# Patient Record
(demographics unavailable — no encounter records)

---

## 2024-11-04 NOTE — CONSULT LETTER
[Dear  ___] : Dear  [unfilled], [( Thank you for referring [unfilled] for consultation for _____ )] : Thank you for referring [unfilled] for consultation for [unfilled] [FreeTextEntry2] : Marylou Reagn MD Family Medicine [address], [address] [place] [address] Phone: [phone] Fax: [phone]

## 2024-11-04 NOTE — DISCUSSION/SUMMARY
[Generalized] : generalized  [Idiopathic] : idiopathic  [Generalized or Multifocal] : generalized or multifocal [Risks Associated with Driving/[place] Law] : As per my usual protocol, the patient was advised in regards to risks and driving privileges associated with the [place] Guidelines.  [Safety Recommendations] : The patient was advised in regards to the risk of seizures and general seizure safety recommendations including not to be bathing alone, climbing to high places and operating heavy machinery. [Compliance with Medications] : The importance of compliance with medications was reinforced. [Medication Side Effects] : High frequency and serious potential medication adverse effects were reviewed with the patient, including but not exclusive to psychiatric effects.  Information sheets on medication side effects were made available to the patient in our clinic.  The patient or advocate agrees to notify us for any concerns. [Sleep Hygiene/Sleep Disruption Risks] : Sleep hygiene and the risks of sleep disruption were discussed. [Risk of Death] : Risk of death associated with seizures / SUDEP was discussed. [FreeTextEntry1] : Based on discussion of pt's prior episodes of recurrent LOC (which could represent CHENCHO seizures vs syncope), as well as pt's most recent episode clearly consistent with GTC on 10/9/24, this is concerning for possibility of multiple unprovoked seizures (underlying epilepsy).   Differential dx / alternative explanation may be h/o recurrent syncope with superimposed first time GTC with buproprion use?  Concern for chronic anxiety.  Sleep issues.  Low libido, wt issues as well in past.  Her PCP/Gyn are currently managing her Bupropion (given that it lowers seizure threshold, we have advised consideration of alternative psychiatric medications, psychotherapy and psychiatry involvement), ie SSRI/SNRI.  Stress management.    Driving restricted.  not driving, but quite difficult for her at this time.  Lacosamide 100mg BID Cont for now.  Consider f/u EEG, med re-eval longterm.  Avoid caffiene, ETOH use.  Lifestyle modifications discussed.  Safety and compliance discussed.  Several questions answered. x45min RTC 5-6mo

## 2024-11-04 NOTE — PHYSICAL EXAM
[FreeTextEntry1] : General Constitutional: alert and in no acute distress.  Psychiatric: affect normal, insight and judgment intact. Neurologic:  Orientation: oriented to person, place, and time  Attention: normal concentrating ability and attention was not decreased.  Language: no difficulty naming common objects, repeating a phrase, writing a sentence; fluency, comprehension, and reading intact.  Fund of knowledge: displays adequate knowledge of personal past history.  Cranial Nerves: visual acuity intact bilaterally, visual fields full to confrontation, pupils equal round and reactive to light, extraocular motion intact, facial sensation intact symmetrically, face symmetrical, hearing was intact bilaterally, tongue and palate midline, head turning and shoulder shrug symmetric and there was no tongue deviation with protrusion.  Motor: muscle tone was normal in all four extremities, muscle strength was normal in all four extremities and normal bulk in all four extremities.  Coordination:. normal gait. balance was intact. there was no past-pointing. no tremor present.  Deep tendon reflexes:  Biceps right 2+. Biceps left 2+.   Triceps right 1+. Triceps left 1+.   Brachioradialis right 1+. Brachioradialis left 1+.   Patella right 2+. Patella left 2+.   Ankle jerk right 1+. Ankle jerk left 1+.  Eyes: the sclera and conjunctiva were normal.  Neck: the appearance of the neck was normal.  Musculoskeletal: no clubbing or cyanosis of the fingernails.  Skin: no lesions, rash
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
## 2025-06-04 NOTE — DISCUSSION/SUMMARY
[Generalized] : generalized  [Idiopathic] : idiopathic  [Generalized or Multifocal] : generalized or multifocal [[place]isks [place] with D[place] Law] : As per my usual protocol, the patient was advised in regards to risks and driving privileges associated with the [place] Guidelines.  [Safety Recommendations] : The patient was advised in regards to the risk of seizures and general seizure safety recommendations including not to be bathing alone, climbing to high places and operating heavy machinery. [Compliance with Medications] : The importance of compliance with medications was reinforced. [Medication Side Effects] : High frequency and serious potential medication adverse effects were reviewed with the patient, including but not exclusive to psychiatric effects.  Information sheets on medication side effects were made available to the patient in our clinic.  The patient or advocate agrees to notify us for any concerns. [Sleep Hygiene/Sleep Disruption Risks] : Sleep hygiene and the risks of sleep disruption were discussed. [Risk of Death] : Risk of death associated with seizures / SUDEP was discussed. [FreeTextEntry1] : Based on discussion of pt's prior episodes of recurrent LOC (which could represent CHENCHO seizures vs syncope), as well as pt's most recent episode clearly consistent with GTC on 10/9/24, this is concerning for possibility of multiple unprovoked seizures (underlying epilepsy).   Differential dx / alternative explanation may be h/o recurrent syncope with superimposed first time GTC with buproprion use?  Concern for chronic anxiety.  Sleep issues.  Low libido, wt issues as well in past.  Her PCP/Gyn are currently managing her Bupropion (given that it lowers seizure threshold, we have advised consideration of alternative psychiatric medications, psychotherapy and psychiatry involvement), ie SSRI/SNRI.  Stress management.    Driving restricted.  not driving, but quite difficult for her at this time.  Avoid caffiene, ETOH use.  Lifestyle modifications discussed.  Safety and compliance discussed.  Lacosamide 100mg BID Cont for now.  Consider f/u EEG, med re-eval longterm.  Several questions answered.  x31min RTC 6mo

## 2025-06-04 NOTE — DATA REVIEWED
[de-identified] : 10/10/24 Unremarkable MRI of the brain.  [de-identified] : EEG in EMU negative.
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
## 2025-06-04 NOTE — CONSULT LETTER
[Dear  ___] : Dear  [unfilled], [( Thank you for referring [unfilled] for consultation for _____ )] : Thank you for referring [unfilled] for consultation for [unfilled] [FreeTextEntry2] : Marylou Regan MD Family Medicine [address], [address] [place] [address] Phone: [phone] Fax: [phone]